# Patient Record
Sex: MALE | Race: OTHER | HISPANIC OR LATINO | URBAN - METROPOLITAN AREA
[De-identification: names, ages, dates, MRNs, and addresses within clinical notes are randomized per-mention and may not be internally consistent; named-entity substitution may affect disease eponyms.]

---

## 2019-07-21 ENCOUNTER — EMERGENCY (EMERGENCY)
Facility: HOSPITAL | Age: 49
LOS: 1 days | Discharge: HOME | End: 2019-07-21
Attending: EMERGENCY MEDICINE
Payer: SELF-PAY

## 2019-07-21 VITALS
HEART RATE: 75 BPM | TEMPERATURE: 98 F | DIASTOLIC BLOOD PRESSURE: 68 MMHG | RESPIRATION RATE: 18 BRPM | SYSTOLIC BLOOD PRESSURE: 105 MMHG | OXYGEN SATURATION: 96 %

## 2019-07-21 VITALS
RESPIRATION RATE: 16 BRPM | HEART RATE: 82 BPM | SYSTOLIC BLOOD PRESSURE: 109 MMHG | DIASTOLIC BLOOD PRESSURE: 69 MMHG | TEMPERATURE: 99 F | OXYGEN SATURATION: 97 %

## 2019-07-21 PROCEDURE — 70450 CT HEAD/BRAIN W/O DYE: CPT

## 2019-07-21 PROCEDURE — 82962 GLUCOSE BLOOD TEST: CPT

## 2019-07-21 PROCEDURE — 99284 EMERGENCY DEPT VISIT MOD MDM: CPT | Mod: 25

## 2019-07-21 PROCEDURE — 99053 MED SERV 10PM-8AM 24 HR FAC: CPT

## 2019-07-21 PROCEDURE — 99284 EMERGENCY DEPT VISIT MOD MDM: CPT

## 2019-07-21 PROCEDURE — 70450 CT HEAD/BRAIN W/O DYE: CPT | Mod: 26

## 2019-07-21 NOTE — ED ADULT NURSE REASSESSMENT NOTE - NS ED NURSE REASSESS COMMENT FT1
PT was found on floor by charge nurse at 1640, pt stated that he was going to bathroom. Dr Babin examined patient. Pt denies pain. CT head ordered. vss. PT was found on floor by charge nurse at 1615, pt stated that he was going to bathroom. Dr Babin examined patient. Pt denies pain. CT head ordered. vss. PT was found on floor by charge nurse at 1815, pt stated that he was going to bathroom. Dr Babin examined patient. Pt denies pain. CT head ordered. vss.

## 2019-07-21 NOTE — ED PROVIDER NOTE - CLINICAL SUMMARY MEDICAL DECISION MAKING FREE TEXT BOX
48 yr old male with no hx BIBEMS for etoh intox, found on street by ems.  pt moving all extremity, refusing to cooperate and asking to be left alone to sleep    alcohol intox- sobriety, fs

## 2019-07-21 NOTE — ED PROVIDER NOTE - CONSTITUTIONAL, MLM
normal... well nourished, awake, alert, oriented to person, and in no apparent distress. unkept, soiled self

## 2019-11-29 NOTE — ED PROVIDER NOTE - OBJECTIVE STATEMENT
48 yr old male with no hx BIBEMS for etoh intox, found on street by ems.  pt moving all extremity, refusing to cooperate and asking to be left alone to sleep Psych/Behavioral

## 2024-11-20 NOTE — ED PROVIDER NOTE - PROGRESS NOTE DETAILS
Chief complaint:   No chief complaint on file.      Vitals:  There were no vitals taken for this visit.        LDL (mg/dL)   Date Value   03/11/2024 128     HISTORY OF PRESENT ILLNESS     HPI     Problem List    Diastolic Heart Failure  Mitral Valve Regurgitation  Paroxysmal Atrial Fibrillation/Flutter - CHADSVASc Stroke Risk Score = 5  Cardioversion 10/06/2017, 10/8/2020, 11/6/2020  PVCs/PACs  Dilated Ascending Aorta 3.8 CM - 11/2024  Hypertension    Jennifer Martin a 75 year old  female patient, born 1949, Medical record Number: [id number], was seen in the [facility] at 11:10 AM,on 11/20/2024 for follow-up cardiology visit.    Jennifer Martin presented for consultation 08/30/17; patient was recovering from shingles and bladder infection, but noted to experience increased shortness of breath. EKG revealed controlled atrial flutter. Patient was advised to obtain a 48 hour Holter: atrial flutter with variable AV conduction no other significant arrhythmias.  Highest heart rate was 158 bpm with an average heart rate of 86 bpm.  Longest RR interval was 2.2 seconds. I had advised patient to started Xarelto and metoprolol XL 25 mg QD - also to obtain an echocardiogram 09/21/17 (LVEF 62%, I/IV diastolic dysfunction) and NM stress test 0921/17; negative for ischemia.     Office visit 9/24/2020 patient had complaints of weight gain and irregular heart rhythm. 12-lead EKG completed and patient found to be in atrial fibrillation.  Cardioversion performed 10/8/2020. At follow up office visit on 10/28/2020 patient was in atrial fibrillation. Patient expressed concern of sleep apnea due to racing heart upon waking and daytime fatigue.  Cardioversion and sleep study advised and amiodarone ordered. Cardioversion completed 11/6/2020 and was converted to NSR with one 200J shock. Patient followed up and was in NSR. Patient had no cardiac complaints. Patient came in for follow up EKG 11/25/2020 and was  shown to be in atrial flutter. Patient now here for follow up.    At visit on 12/02/2020, the patient is feeling well. The patient is scheduled for a sleep study. The patient reports some SOB on exertion.     Visit on 06/29/2021 the patient saw Yuly Doan PA-C, the patient reports she can feel she is atrial fibrillation due to her shortness of breath. She reports she was taken off her amiodarone due to bradycardia;  Remains on metoprolol. She reports she has a tumor in her kidney which is scheduled to be ablated at [facility].    Patient started following with Dr. Wahl and was scheduled for a CV on 09/22/2021 which was cancelled due to the patient being in SR.    Patient followed up with Dr. Wahl on 11/7/2022. Patient reported to be in stable condition. No changes were made to patient's medication regimen.     Visit on 12/28/2022, patient reported to be in stable condition. Patient reports no cardiac symptoms or complaints.    At last visit, 12/27/2023, patent reported feeling well.  Repeat echo ordered.     Since last visit, patient has been following with Dr. Wahl.  No changes to medication regimen or additional testing ordered at this time.     Echo completed 11/27/2024: LVEF 62%, grade I/IV diastolic dysfunction, trace MR, mild TR, dilated ascending aorta 3.8 CM.    Today, ***    PAST MEDICAL, FAMILY AND SOCIAL HISTORY     Medications:  Current Outpatient Medications   Medication Sig Dispense Refill    dofetilide (TIKOSYN) 500 MCG capsule Take 1 capsule by mouth every 12 hours. 180 capsule 3    apixaBAN (Eliquis) 5 MG Tab Take 1 tablet by mouth every 12 hours. 180 tablet 3    influenza virus trivalent vaccine inactivated (Fluzone High-Dose) 0.5 ML prefilled syringe for injection Inject 0.5 mLs into the muscle 1 time for 1 dose 0.5 mL 0    COVID-19 mRNA 12Y+, Moderna, (SPIKEVAX) 50 MCG/0.5ML Suspension Prefilled Syringe Inject 0.5 mLs into the muscle 1 time for 1 dose 0.5 mL 0    losartan (COZAAR) 50  MG tablet TAKE 1 TABLET BY MOUTH EVERY DAY 90 tablet 3    furosemide (LASIX) 20 MG tablet TAKE 1 TABLET BY MOUTH DAILY 90 tablet 3    famotidine (PEPCID) 40 MG tablet Take 1 tablet by mouth nightly. 90 tablet 3    estradiol (ESTRACE) 0.1 MG/GM vaginal cream Place 1 g vaginally 1 day a week. (Patient not taking: Reported on 10/28/2024) 42.5 g 1    Calcium Carbonate (CALCIUM 500 PO) Take by mouth daily. 2 gummy daily      Multiple Minerals-Vitamins ([employer] Calcium 600 Plus) Chew Tab Chew 1 tablet by mouth daily.       acetaminophen (TYLENOL) 500 MG tablet Take 500 mg by mouth every 6 hours as needed for Pain.      Fexofenadine HCl (ALLEGRA ALLERGY PO) Take 1 capsule by mouth as needed.        No current facility-administered medications for this visit.     Allergies:  ALLERGIES:   Allergen Reactions    Augmentin [Amoclan] RASH    Adhesive   (Environmental) PRURITUS     Swelling at site, hive like reaction    Lisinopril Cough    Seasonal Other (See Comments)    Zithromax [Azithromycin Dihydrate] RASH     Past Medical  History/Surgeries:  Past Medical History:   Diagnosis Date    Actinic keratoses     Atrial fibrillation and flutter (CMD)     Cancer of kidney  (CMD)     seen in [place]  - ablation done.    Chronic kidney disease     Colon cancer screening 08/21/2019    Diverticulosis of colon 08/21/2019    Essential (primary) hypertension     Pt states this is since her atrial fibirllation    PAC (premature atrial contraction) 08/18/2017    PVC's (premature ventricular contractions) 08/18/2017    Skin cancer 2014    Non Basal-cell per patient    Urinary tract infection      Past Surgical History:   Procedure Laterality Date    Colonoscopy  08/21/2019    Screening colonoscopy in 10 years, she decides to do further screening colonoscopies.    Colonoscopy diagnostic  06/16/2008    normal, repeat 2018    Cystocele repair      Hysterectomy  01/04/2008    Joint replacement      Removal of tonsils,<12 y/o      Repair of rectocele       John/cardioversion  11/06/2020    Dr. Mac:  Afib:      Total knee arthroplasty Left 11/2018     Family History:  Family History   Problem Relation Age of Onset    Cancer, Breast Mother     Osteoporosis Father     Cancer Sister         cervical    Thyroid Sister     Other Brother         osteo arthritis    Patient is unaware of any medical problems Brother     Hypertension Son     Hypertension Son      Social History:  Social History     Tobacco Use    Smoking status: Never    Smokeless tobacco: Never   Substance Use Topics    Alcohol use: Yes     Alcohol/week: 9.0 standard drinks of alcohol     Types: 9 Standard drinks or equivalent per week     Comment: a drink a day     REVIEW OF SYSTEMS     Review of Systems   Constitutional:  Negative for activity change, appetite change, fatigue and unexpected weight change.   Respiratory:  Negative for apnea, chest tightness and shortness of breath.    Cardiovascular:  Negative for chest pain, palpitations and leg swelling.   Musculoskeletal:  Negative for myalgias.   Skin:  Negative for pallor.   Neurological:  Negative for dizziness, syncope, light-headedness and numbness.   All other systems reviewed and are negative.      PHYSICAL EXAM     Physical Exam  HENT:      Head: Normocephalic.      Neck: Neck supple.   Cardiovascular:      Rate and Rhythm: Normal rate and regular rhythm.      Heart sounds: Normal heart sounds. No murmur heard.     No friction rub. No gallop.   Pulmonary:      Effort: Pulmonary effort is normal. No respiratory distress.      Breath sounds: Normal breath sounds. No wheezing or rales.   Chest:      Chest wall: No tenderness.   Abdominal:      Palpations: Abdomen is soft.   Musculoskeletal:         General: Normal range of motion.   Skin:     General: Skin is warm.   Neurological:      Mental Status: She is alert and oriented to person, place, and time.       Echo 11/27/2024:  Final Impressions    * Normal left ventricular systolic function with  ejection fraction of 62 %.    * Mildly increased left ventricular wall thickness.    * Grade I left ventricular diastolic dysfunction.    * LV Global longitudinal strain -23.0 %.    * Trace mitral valve regurgitation.    * Mild tricuspid valve regurgitation.    * Mildly dilated ascending aorta, 3.8 cm.    * No pericardial effusion.    * No significant change since the prior study.    EKG 10/21/2024:        EKG 11/7/2022      Echo 07/23/2021  Normal left ventricular size, systolic function and wall thickness, with no regional wall motion abnormalities. LVEF, 68%. Indeterminate diastolic function. Rhythm precludes evaluation of diastolic function.  Trace-to-mild mitral valve regurgitation.  Trace-to-mild tricuspid valve regurgitation.  No significant change since the prior study 9/21/2017.    CT Chest 11/16/2020 ([facility])  1. Multiple tiny bilateral indeterminate pulmonary nodules.  2. Mild nodular asymmetry in the lateral right breast is indeterminate. Mammography may be helpful in further evaluation.    MAGNOLIA/Cardioversion 11/6/2020:  No QUIRINO clot  Converted to NSR with one 200J shock.  Left ventricular ejection fraction, 60%.    Cardioversion/MAGNOLIA 10/06/2017  Successful cardioversion after transesophageal echocardiogram with one shock of 360 J biphasic.  No left atrial appendage clot  Mild-to-moderate mitral regurgitation  No intra-atrial shunt  Normal LV function  Normal chamber sizes    NM Stress Test 09/21/2017  Myocardial perfusion imaging showed  no area of ischemia or infarction.    Normal wall motion imaging  Ejection fraction of 60 %   Normal perfusion scan     Echo 09/21/2017  Normal left ventricular size, systolic function and wall thickness, with no regional wall motion abnormalities. Left ventricular ejection fraction, 62 %. Grade I/IV diastolic dysfunction (abnormal relaxation filling pattern), normal to mildly elevated filling pressures. Mild mitral valve regurgitation. Trace-to-mild tricuspid valve  regurgitation. Trace-to-mild aortic valve regurgitation.  Abnormal Echo.    Holter 08/24/2017  Holter monitoring period documents atrial flutter with variable AV conduction. No other clinically significant arrhythmias. No significant pauses. Clinical correlation is urged.    ASSESSMENT/PLAN     Recent Labs   Lab 03/11/24  0832   BUN 16   Creatinine 0.79   Potassium 4.4   Cholesterol 219*   HDL 76   Cholesterol/ HDL Ratio 2.9   Triglycerides 76   CALCLDL 128   GOT/AST 18   GPT 22     LDL (mg/dL)   Date Value   03/11/2024 128       ASSESSMENT AND PLAN:    Diastolic Heart Failure/Mitral Valve regurgitation   Echo 09/21/2017: Left ventricular ejection fraction, 62%. Grade I/IV diastolic dysfunction.  MAGNOLIA 10/06/17: Mild-to-moderate mitral regurgitation  Component      Latest Ref Rng & Units 1/24/2018   B-TYPE NATRIURETIC PEPTIDE      <100 pg/mL 312 (H)   Echo 07/23/2021: LVEF, 68%. Indeterminate diastolic function. Rhythm precludes evaluation of diastolic function.  Trace-to-mild MV, TV regurgitation. No significant change since the prior study 9/21/2017.  Echo 11/27/2024: LVEF 62%, grade I/IV diastolic dysfunction, trace MR, mild TR.  ***    Paroxysmal Atrial Fibrillation  CHADSVASc Stroke Risk Score = 5  Cardioversion 10/06/17: Successful cardioversion after transesophageal echocardiogram with one shock of 360 J biphasic  Successful cardioversion completed 10/8/2020.  Atrial fibrillation to sinus bradycardia. Was in A-fib office visit 10/28/2020  Successful cardioversion 11/6/2020 with 1 200J shock  Patient noted to be in a-flutter 11/25/2020 with nurse visit EKG.  Amiodarone has not helped in the past. Currently anticoagulated with Eliquis.  Has started following with [facility]. Patient was scheduled for a cardioversion on 09/22/2021 which was cancelled a the patient had converted back to sinus rhythm on her own.  Currently on tikosyn 500MCG BID  Following with Dr. Wahl    Dilated Ascending Aorta:  Echo 11/27/2024: 3.8  CM  Continue to monitor     Hypertension  There were no vitals filed for this visit.  Creatinine (mg/dL)   Date Value   03/11/2024 0.79   Blood pressure is *** controlled.      FOLLOW UP: Jennifer Martin is expected to follow up in *** months.    Recommendations Summary:    Continue Current Medications  Advised Echocardiogram  Advised NM Stress Test  Advised Stress Echo  Advised Event Monitor  Advised Coronary Angiogram  Encouraged Regular Daily Exercise    ***    Summary:  Jennifer Martin is a 75 year old female presenting with the above listed problems.  Any concerning clinical signs or symptoms were addressed. Jennifer Martin remains clinically stable. Today's exam finding are noted. Reviewed medications, last ejection fraction,( 62 %), low-density lipoprotein,(128 mg/dL) serum creatinine,( 0.79 mg/dL) and other relevant tests. Encouraged compliance with prescribed therapies and lifestyle changes. Jennifer Martin's  health concerns and questions were addressed. Future recommendations and any required tests were explained, and cardiology were appointments scheduled. Suggested follow up listed above, earlier if needed.    ***     cummings: pt got oob and walked towards RN station and fell. pt landed on side.  doesn't appear to have hit head.  pt able to get up with assistance and walk back to bed.  pt stated he wanted to use restroom to urinate. was able to walk to bathroom with pca. will get a head ct and monitor cummings: ct head neg.  pt sleeping and no distress.  still intox cummings: awake.  requesting to urinate.    ambulating with steady gait.  aox4.  no complains  dx etoh intox.  f/u detox center.  left ambulatory.  return precautions given.  doesn't want